# Patient Record
Sex: FEMALE | Race: WHITE | URBAN - METROPOLITAN AREA
[De-identification: names, ages, dates, MRNs, and addresses within clinical notes are randomized per-mention and may not be internally consistent; named-entity substitution may affect disease eponyms.]

---

## 2021-08-02 ENCOUNTER — OUT OF OFFICE VISIT (OUTPATIENT)
Dept: URBAN - METROPOLITAN AREA MEDICAL CENTER 25 | Facility: MEDICAL CENTER | Age: 56
End: 2021-08-02
Payer: COMMERCIAL

## 2021-08-02 DIAGNOSIS — R10.30 ABDOMINAL PAIN OF UNKNOWN CAUSE: ICD-10-CM

## 2021-08-02 DIAGNOSIS — A04.72 C. DIFFICILE COLITIS: ICD-10-CM

## 2021-08-02 DIAGNOSIS — R93.3 ABN FINDINGS-GI TRACT: ICD-10-CM

## 2021-08-02 DIAGNOSIS — D50.9 ANEMIA, IRON DEFICIENCY: ICD-10-CM

## 2021-08-02 PROCEDURE — 99253 IP/OBS CNSLTJ NEW/EST LOW 45: CPT | Performed by: INTERNAL MEDICINE

## 2021-09-10 ENCOUNTER — TELEPHONE ENCOUNTER (OUTPATIENT)
Dept: URBAN - METROPOLITAN AREA CLINIC 19 | Facility: CLINIC | Age: 56
End: 2021-09-10

## 2021-09-21 ENCOUNTER — OFFICE VISIT (OUTPATIENT)
Dept: URBAN - METROPOLITAN AREA CLINIC 19 | Facility: CLINIC | Age: 56
End: 2021-09-21
Payer: COMMERCIAL

## 2021-09-21 ENCOUNTER — WEB ENCOUNTER (OUTPATIENT)
Dept: URBAN - METROPOLITAN AREA CLINIC 19 | Facility: CLINIC | Age: 56
End: 2021-09-21

## 2021-09-21 DIAGNOSIS — A04.72 CLOSTRIDIUM DIFFICILE: ICD-10-CM

## 2021-09-21 DIAGNOSIS — R19.7 DIARRHEA: ICD-10-CM

## 2021-09-21 DIAGNOSIS — D50.9 IRON DEFICIENCY ANEMIA: ICD-10-CM

## 2021-09-21 DIAGNOSIS — R10.30 LOWER ABDOMINAL PAIN: ICD-10-CM

## 2021-09-21 PROCEDURE — 99214 OFFICE O/P EST MOD 30 MIN: CPT | Performed by: INTERNAL MEDICINE

## 2021-09-21 RX ORDER — DICYCLOMINE HYDROCHLORIDE 20 MG/1
1 TABLET TABLET ORAL THREE TIMES A DAY
Qty: 90 TABLET | Refills: 2 | OUTPATIENT
Start: 2021-09-21 | End: 2021-12-19

## 2021-09-21 RX ORDER — DICYCLOMINE HYDROCHLORIDE 10 MG/1
2 CAPSULES CAPSULE ORAL THREE TIMES A DAY
Qty: 180 | Refills: 2 | OUTPATIENT
Start: 2021-09-21 | End: 2021-12-19

## 2021-09-21 NOTE — HPI-TODAY'S VISIT:
Patient presents for follow-up after recent hospitalization (7/31/21 to 8/2/21) at [facility] for C. difficile colitis.  The patient had received Bactrim and eventually cefuroxime for a UTI about 11 days prior to admission, and she started having severe diarrhea and left-sided abdominal pain.  She was positive for C. difficile antigen in the ED and was started on oral vancomycin/IV Flagyl for that; she was also diagnosed with pyuria/UTI and placed on IV Zosyn concurrently.  Her diarrhea improved quickly, and she was discharged on oral vancomycin with instructions to take a probiotic after completion.  The patient did well until Labor Day weekend when her diarrhea recurred.  She has had abdominal cramping, chills and diarrhea with intermittent blood since then.  She has some rectal discomfort; concerned about hemorrhoids.  She has not been retested for C. difficile.   Her hemoglobin was 10.3 g/dL (MCV 79) in the hospital - she has been told that she is iron deficient and is currently on oral iron supplementation.

## 2021-09-21 NOTE — PHYSICAL EXAM GASTROINTESTINAL
Abdomen , soft, LLQ tender, nondistended , no guarding or rigidity , no masses palpable , normal bowel sounds , Liver and Spleen , no hepatomegaly present , no hepatosplenomegaly , liver nontender , spleen not palpable Rectal deferred

## 2021-09-22 ENCOUNTER — LAB OUTSIDE AN ENCOUNTER (OUTPATIENT)
Dept: URBAN - METROPOLITAN AREA CLINIC 19 | Facility: CLINIC | Age: 56
End: 2021-09-22

## 2021-09-22 PROBLEM — 62315008 DIARRHEA: Status: ACTIVE | Noted: 2021-09-21

## 2021-09-22 PROBLEM — 54586004 LOWER ABDOMINAL PAIN: Status: ACTIVE | Noted: 2021-09-21

## 2021-09-22 PROBLEM — 87522002 IRON DEFICIENCY ANEMIA: Status: ACTIVE | Noted: 2021-09-21

## 2021-09-22 LAB
A/G RATIO: 2.1
ALBUMIN: 4.4
ALKALINE PHOSPHATASE: 89
ALT (SGPT): 23
AST (SGOT): 25
BILIRUBIN, TOTAL: 1.2
BUN/CREATININE RATIO: 15
BUN: 12
C-REACTIVE PROTEIN, QUANT: 2
CALCIUM: 9.5
CARBON DIOXIDE, TOTAL: 25
CHLORIDE: 105
CREATININE: 0.79
EGFR IF AFRICN AM: 97
EGFR IF NONAFRICN AM: 84
FERRITIN, SERUM: 18
GLOBULIN, TOTAL: 2.1
GLUCOSE: 86
HEMATOCRIT: 44.1
HEMOGLOBIN: 13.9
IRON BIND.CAP.(TIBC): 372
IRON SATURATION: 15
IRON: 55
MCH: 27.3
MCHC: 31.5
MCV: 87
NRBC: (no result)
PLATELETS: 328
POTASSIUM: 3.7
PROTEIN, TOTAL: 6.5
RBC: 5.09
RDW: 15.6
SEDIMENTATION RATE-WESTERGREN: 4
SODIUM: 143
UIBC: 317
WBC: 6.3

## 2021-09-24 ENCOUNTER — TELEPHONE ENCOUNTER (OUTPATIENT)
Dept: URBAN - METROPOLITAN AREA CLINIC 19 | Facility: CLINIC | Age: 56
End: 2021-09-24

## 2021-09-24 ENCOUNTER — WEB ENCOUNTER (OUTPATIENT)
Dept: URBAN - METROPOLITAN AREA CLINIC 19 | Facility: CLINIC | Age: 56
End: 2021-09-24

## 2021-09-24 ENCOUNTER — TELEPHONE ENCOUNTER (OUTPATIENT)
Dept: URBAN - METROPOLITAN AREA CLINIC 92 | Facility: CLINIC | Age: 56
End: 2021-09-24

## 2021-09-24 PROBLEM — 5933001 CLOSTRIDIUM DIFFICILE: Status: ACTIVE | Noted: 2021-09-21

## 2021-09-24 LAB — C DIFFICILE TOXINS A+B, EIA: POSITIVE

## 2021-09-24 RX ORDER — DICYCLOMINE HYDROCHLORIDE 20 MG/1
1 TABLET TABLET ORAL THREE TIMES A DAY
Qty: 90 TABLET | Refills: 2 | Status: ACTIVE | COMMUNITY
Start: 2021-09-21 | End: 2021-12-19

## 2021-09-24 RX ORDER — DICYCLOMINE HYDROCHLORIDE 10 MG/1
2 CAPSULES CAPSULE ORAL THREE TIMES A DAY
Qty: 180 | Refills: 2 | Status: ACTIVE | COMMUNITY
Start: 2021-09-21 | End: 2021-12-19

## 2021-09-24 RX ORDER — FIDAXOMICIN 200 MG/1
1 TABLET TABLET, FILM COATED ORAL TWICE A DAY
Qty: 20 | OUTPATIENT
Start: 2021-09-24 | End: 2021-10-04

## 2021-10-03 ENCOUNTER — WEB ENCOUNTER (OUTPATIENT)
Dept: URBAN - METROPOLITAN AREA CLINIC 19 | Facility: CLINIC | Age: 56
End: 2021-10-03

## 2021-11-19 ENCOUNTER — OFFICE VISIT (OUTPATIENT)
Dept: URBAN - METROPOLITAN AREA CLINIC 19 | Facility: CLINIC | Age: 56
End: 2021-11-19

## 2024-01-09 ENCOUNTER — WEB ENCOUNTER (OUTPATIENT)
Dept: URBAN - METROPOLITAN AREA CLINIC 19 | Facility: CLINIC | Age: 59
End: 2024-01-09

## 2024-01-10 ENCOUNTER — WEB ENCOUNTER (OUTPATIENT)
Dept: URBAN - METROPOLITAN AREA CLINIC 19 | Facility: CLINIC | Age: 59
End: 2024-01-10

## 2024-01-16 ENCOUNTER — OFFICE VISIT (OUTPATIENT)
Dept: URBAN - METROPOLITAN AREA CLINIC 19 | Facility: CLINIC | Age: 59
End: 2024-01-16

## 2024-01-16 ENCOUNTER — OFFICE VISIT (OUTPATIENT)
Dept: URBAN - METROPOLITAN AREA CLINIC 19 | Facility: CLINIC | Age: 59
End: 2024-01-16
Payer: COMMERCIAL

## 2024-01-16 VITALS
WEIGHT: 130.8 LBS | SYSTOLIC BLOOD PRESSURE: 122 MMHG | HEIGHT: 60 IN | HEART RATE: 42 BPM | DIASTOLIC BLOOD PRESSURE: 84 MMHG | TEMPERATURE: 97.3 F | BODY MASS INDEX: 25.68 KG/M2

## 2024-01-16 DIAGNOSIS — K90.89 BILE SALT-INDUCED DIARRHEA: ICD-10-CM

## 2024-01-16 DIAGNOSIS — Z90.49 HISTORY OF CHOLECYSTECTOMY: ICD-10-CM

## 2024-01-16 DIAGNOSIS — R19.7 DIARRHEA, UNSPECIFIED TYPE: ICD-10-CM

## 2024-01-16 DIAGNOSIS — R17 ELEVATED BILIRUBIN: ICD-10-CM

## 2024-01-16 DIAGNOSIS — K58.0 IRRITABLE BOWEL SYNDROME WITH DIARRHEA: ICD-10-CM

## 2024-01-16 DIAGNOSIS — Z87.19 HISTORY OF GALLSTONES: ICD-10-CM

## 2024-01-16 DIAGNOSIS — Z86.19 HISTORY OF CLOSTRIDIOIDES DIFFICILE COLITIS: ICD-10-CM

## 2024-01-16 PROBLEM — 197125005: Status: ACTIVE | Noted: 2024-01-16

## 2024-01-16 PROCEDURE — 99214 OFFICE O/P EST MOD 30 MIN: CPT | Performed by: INTERNAL MEDICINE

## 2024-01-16 RX ORDER — CHOLESTYRAMINE 4 G/9G
1 PACKET MIXED WITH WATER OR NON-CARBONATED DRINK POWDER, FOR SUSPENSION ORAL ONCE A DAY
Qty: 90 | Refills: 3 | OUTPATIENT
Start: 2024-01-16

## 2024-01-16 RX ORDER — SACCHAROMYCES BOULARDII 50 MG
TAKE 1 CAPSULE CAPSULE ORAL DAILY
Qty: 90 | Refills: 3 | OUTPATIENT
Start: 2024-01-16 | End: 2025-01-10

## 2024-01-16 RX ORDER — HYOSCYAMINE SULFATE 0.125 MG
1-2 TABLETS ON THE TONGUE AND ALLOW TO DISSOLVE TABLET,DISINTEGRATING ORAL
Qty: 100 | Refills: 5 | OUTPATIENT
Start: 2024-01-16 | End: 2024-07-14

## 2024-01-16 NOTE — HPI-TODAY'S VISIT:
9/21/21: Patient presents for follow-up after recent hospitalization (7/31/21 to 8/2/21) at [facility] for C. difficile colitis.  The patient had received Bactrim and eventually cefuroxime for a UTI about 11 days prior to admission, and she started having severe diarrhea and left-sided abdominal pain.  She was positive for C. difficile antigen in the ED and was started on oral vancomycin/IV Flagyl for that; she was also diagnosed with pyuria/UTI and placed on IV Zosyn concurrently.  Her diarrhea improved quickly, and she was discharged on oral vancomycin with instructions to take a probiotic after completion.  The patient did well until Labor Day weekend when her diarrhea recurred.  She has had abdominal cramping, chills and diarrhea with intermittent blood since then.  She has some rectal discomfort; concerned about hemorrhoids.  She has not been retested for C. difficile.   Her hemoglobin was 10.3 g/dL (MCV 79) in the hospital - she has been told that she is iron deficient and is currently on oral iron supplementation.  1/16/24: Patient presents as a referral from Dr. Sary Marie for evaluation of elevated bilirubin. She had a RUQ ultrasound that showed gallstones, so she underwent a cholecystectomy by Dr. Yung Kumar on 12/7/23. She presents today because she has nausea and abdominal cramps. On 10/27/23, her bilirubin was 2.0 - no other labs available for review.  Apparently, the patient saw an infectious disease specialist and gastroenterologist in [facility] (where she lives most of the time - she comes to the [place] area about twice a month) since I last saw her. She was eventually "cleared" of C. difficile by ID and reportedly had a normal colonoscopy done. Will get records.   However, she still has intermittent cramping, bloating, and loose stools, sometimes having accidents. She has not been treated with a bile acid sequestrant after her cholecystectomy. She has been taking a probiotic (unknown brand) since diagnosed with C. difficile. She uses dicyclomine occasionally, but it causes sedation. We discussed IBS-D and bile salt-induced diarrhea in detail. I recommended cholestyramine and probiotics (Florastor) as well as hyoscyamine (to replace dicyclomine). She asked about diet - I ga her information about the low-FODMAP diet. She was concerned about her endometriosis being a factor - she has had total hysterectomy and bilateral oophorectomy. I told her to discuss a possible referral to gynecology through her PCP if she was really concerned about this. Does not seem likely in her postmenopausal state.  In regards to the elevated bilirubin, this was elevated before her cholecystectomy. I will rule out other causes now - she does have some "decreased energy" but no pruritus to suggest PBC.

## 2024-01-18 ENCOUNTER — TELEPHONE ENCOUNTER (OUTPATIENT)
Dept: URBAN - METROPOLITAN AREA CLINIC 19 | Facility: CLINIC | Age: 59
End: 2024-01-18

## 2024-01-18 LAB
A/G RATIO: 2.5
ACTIN (SMOOTH MUSCLE) ANTIBODY: 8
ALBUMIN: 4.5
ALKALINE PHOSPHATASE: 68
ALPHA-1-ANTITRYPSIN, SERUM: 145
ALT (SGPT): 25
ANA DIRECT: NEGATIVE
AST (SGOT): 26
BILIRUBIN, TOTAL: 2
BUN/CREATININE RATIO: 14
BUN: 10
CALCIUM: 9.6
CARBON DIOXIDE, TOTAL: 23
CERULOPLASMIN: 19.7
CHLORIDE: 105
COPPER, SERUM: 87
CREATININE: 0.69
EGFR: 101
FERRITIN, SERUM: 27
GLOBULIN, TOTAL: 1.8
GLUCOSE: 82
HBSAG SCREEN: NEGATIVE
HEMATOCRIT: 42.5
HEMOGLOBIN: 14.2
HEP A AB, IGM: NEGATIVE
HEP A AB, TOTAL: NEGATIVE
HEP B CORE AB, IGM: NEGATIVE
HEP B CORE AB, TOT: NEGATIVE
HEP C VIRUS AB: NON REACTIVE
HEPATITIS B SURF AB QUANT: <3.1
IGG, SUBCLASS 4: 10
IMMUNOGLOBULIN G, QN, SERUM: 662
IRON BIND.CAP.(TIBC): 341
IRON SATURATION: 23
IRON: 79
LIVER-KIDNEY MICROSOMAL AB: 0.9
MCH: 30.5
MCHC: 33.4
MCV: 91
NRBC: (no result)
PHENOTYPE (PI): (no result)
PLATELETS: 254
POTASSIUM: 3.8
PROTEIN, TOTAL: 6.3
RBC: 4.66
RDW: 12.9
SODIUM: 142
UIBC: 262
WBC: 6

## 2024-01-19 ENCOUNTER — LAB OUTSIDE AN ENCOUNTER (OUTPATIENT)
Dept: URBAN - METROPOLITAN AREA CLINIC 19 | Facility: CLINIC | Age: 59
End: 2024-01-19

## 2024-01-23 LAB
ADENOVIRUS F 40/41: NOT DETECTED
CAMPYLOBACTER: NOT DETECTED
CLOSTRIDIUM DIFFICILE: NOT DETECTED
ENTAMOEBA HISTOLYTICA: NOT DETECTED
ENTEROAGGREGATIVE E.COLI: NOT DETECTED
ENTEROTOXIGENIC E.COLI: NOT DETECTED
ESCHERICHIA COLI O157: NOT DETECTED
FECAL FAT, QUALITATIVE: (no result)
GIARDIA LAMBLIA: NOT DETECTED
NOROVIRUS GI/GII: NOT DETECTED
PANCREATICELASTASE ELISA, STOOL: (no result)
ROTAVIRUS A: NOT DETECTED
SALMONELLA SPP.: NOT DETECTED
SHIGA-LIKE TOXIN PRODUCING E.COLI: NOT DETECTED
SHIGELLA SPP. / ENTEROINVASIVE E.COLI: NOT DETECTED
VIBRIO PARAHAEMOLYTICUS: NOT DETECTED
VIBRIO SPP.: NOT DETECTED
YERSINIA ENTEROCOLITICA: NOT DETECTED

## 2024-01-24 ENCOUNTER — WEB ENCOUNTER (OUTPATIENT)
Dept: URBAN - METROPOLITAN AREA CLINIC 19 | Facility: CLINIC | Age: 59
End: 2024-01-24

## 2024-01-29 ENCOUNTER — WEB ENCOUNTER (OUTPATIENT)
Dept: URBAN - METROPOLITAN AREA CLINIC 19 | Facility: CLINIC | Age: 59
End: 2024-01-29

## 2024-01-29 LAB — MITOCHONDRIAL (M2) ANTIBODY: <20

## 2024-04-16 ENCOUNTER — OV EP (OUTPATIENT)
Dept: URBAN - METROPOLITAN AREA CLINIC 19 | Facility: CLINIC | Age: 59
End: 2024-04-16

## 2024-04-16 RX ORDER — CHOLESTYRAMINE 4 G/9G
1 PACKET MIXED WITH WATER OR NON-CARBONATED DRINK POWDER, FOR SUSPENSION ORAL ONCE A DAY
Qty: 90 | Refills: 3 | COMMUNITY
Start: 2024-01-16

## 2024-04-16 RX ORDER — HYOSCYAMINE SULFATE 0.125 MG
1-2 TABLETS ON THE TONGUE AND ALLOW TO DISSOLVE TABLET,DISINTEGRATING ORAL
Qty: 100 | Refills: 5 | COMMUNITY
Start: 2024-01-16 | End: 2024-07-14

## 2024-04-16 RX ORDER — SACCHAROMYCES BOULARDII 50 MG
TAKE 1 CAPSULE CAPSULE ORAL DAILY
Qty: 90 | Refills: 3 | COMMUNITY
Start: 2024-01-16 | End: 2025-01-10